# Patient Record
Sex: FEMALE | Race: BLACK OR AFRICAN AMERICAN | NOT HISPANIC OR LATINO | Employment: OTHER | URBAN - METROPOLITAN AREA
[De-identification: names, ages, dates, MRNs, and addresses within clinical notes are randomized per-mention and may not be internally consistent; named-entity substitution may affect disease eponyms.]

---

## 2023-07-21 LAB
ALANINE AMINOTRANSFERASE (SGPT) (U/L) IN SER/PLAS: 12 U/L (ref 7–45)
ALBUMIN (G/DL) IN SER/PLAS: 3.2 G/DL (ref 3.4–5)
ALKALINE PHOSPHATASE (U/L) IN SER/PLAS: 60 U/L (ref 33–136)
ANION GAP IN SER/PLAS: 13 MMOL/L (ref 10–20)
ASPARTATE AMINOTRANSFERASE (SGOT) (U/L) IN SER/PLAS: 17 U/L (ref 9–39)
BASOPHILS (10*3/UL) IN BLOOD BY AUTOMATED COUNT: 0.04 X10E9/L (ref 0–0.1)
BASOPHILS/100 LEUKOCYTES IN BLOOD BY AUTOMATED COUNT: 0.8 % (ref 0–2)
BILIRUBIN TOTAL (MG/DL) IN SER/PLAS: 0.4 MG/DL (ref 0–1.2)
CALCIDIOL (25 OH VITAMIN D3) (NG/ML) IN SER/PLAS: 55 NG/ML
CALCIUM (MG/DL) IN SER/PLAS: 9.1 MG/DL (ref 8.6–10.3)
CARBON DIOXIDE, TOTAL (MMOL/L) IN SER/PLAS: 26 MMOL/L (ref 21–32)
CHLORIDE (MMOL/L) IN SER/PLAS: 105 MMOL/L (ref 98–107)
CHOLESTEROL (MG/DL) IN SER/PLAS: 181 MG/DL (ref 0–199)
CHOLESTEROL IN HDL (MG/DL) IN SER/PLAS: 42.7 MG/DL
CHOLESTEROL/HDL RATIO: 4.2
CREATININE (MG/DL) IN SER/PLAS: 0.67 MG/DL (ref 0.5–1.05)
EOSINOPHILS (10*3/UL) IN BLOOD BY AUTOMATED COUNT: 0.2 X10E9/L (ref 0–0.4)
EOSINOPHILS/100 LEUKOCYTES IN BLOOD BY AUTOMATED COUNT: 3.8 % (ref 0–6)
ERYTHROCYTE DISTRIBUTION WIDTH (RATIO) BY AUTOMATED COUNT: 15.9 % (ref 11.5–14.5)
ERYTHROCYTE MEAN CORPUSCULAR HEMOGLOBIN CONCENTRATION (G/DL) BY AUTOMATED: 30.5 G/DL (ref 32–36)
ERYTHROCYTE MEAN CORPUSCULAR VOLUME (FL) BY AUTOMATED COUNT: 90 FL (ref 80–100)
ERYTHROCYTES (10*6/UL) IN BLOOD BY AUTOMATED COUNT: 4.62 X10E12/L (ref 4–5.2)
GFR FEMALE: 86 ML/MIN/1.73M2
GLUCOSE (MG/DL) IN SER/PLAS: 62 MG/DL (ref 74–99)
HEMATOCRIT (%) IN BLOOD BY AUTOMATED COUNT: 41.7 % (ref 36–46)
HEMOGLOBIN (G/DL) IN BLOOD: 12.7 G/DL (ref 12–16)
IMMATURE GRANULOCYTES/100 LEUKOCYTES IN BLOOD BY AUTOMATED COUNT: 0.2 % (ref 0–0.9)
LDL: 112 MG/DL (ref 0–99)
LEUKOCYTES (10*3/UL) IN BLOOD BY AUTOMATED COUNT: 5.2 X10E9/L (ref 4.4–11.3)
LYMPHOCYTES (10*3/UL) IN BLOOD BY AUTOMATED COUNT: 2.03 X10E9/L (ref 0.8–3)
LYMPHOCYTES/100 LEUKOCYTES IN BLOOD BY AUTOMATED COUNT: 38.8 % (ref 13–44)
MONOCYTES (10*3/UL) IN BLOOD BY AUTOMATED COUNT: 0.46 X10E9/L (ref 0.05–0.8)
MONOCYTES/100 LEUKOCYTES IN BLOOD BY AUTOMATED COUNT: 8.8 % (ref 2–10)
NEUTROPHILS (10*3/UL) IN BLOOD BY AUTOMATED COUNT: 2.49 X10E9/L (ref 1.6–5.5)
NEUTROPHILS/100 LEUKOCYTES IN BLOOD BY AUTOMATED COUNT: 47.6 % (ref 40–80)
PLATELETS (10*3/UL) IN BLOOD AUTOMATED COUNT: 227 X10E9/L (ref 150–450)
POTASSIUM (MMOL/L) IN SER/PLAS: 4.4 MMOL/L (ref 3.5–5.3)
PROTEIN TOTAL: 6.1 G/DL (ref 6.4–8.2)
SODIUM (MMOL/L) IN SER/PLAS: 140 MMOL/L (ref 136–145)
THYROTROPIN (MIU/L) IN SER/PLAS BY DETECTION LIMIT <= 0.05 MIU/L: 1.62 MIU/L (ref 0.44–3.98)
TRIGLYCERIDE (MG/DL) IN SER/PLAS: 133 MG/DL (ref 0–149)
URATE (MG/DL) IN SER/PLAS: 5.1 MG/DL (ref 2.3–6.7)
UREA NITROGEN (MG/DL) IN SER/PLAS: 11 MG/DL (ref 6–23)
VLDL: 27 MG/DL (ref 0–40)

## 2023-11-15 ENCOUNTER — OFFICE VISIT (OUTPATIENT)
Dept: GERIATRIC MEDICINE | Facility: CLINIC | Age: 83
End: 2023-11-15
Payer: MEDICARE

## 2023-11-15 DIAGNOSIS — E03.9 HYPOTHYROIDISM, UNSPECIFIED TYPE: ICD-10-CM

## 2023-11-15 DIAGNOSIS — M15.9 GENERALIZED OSTEOARTHRITIS OF MULTIPLE SITES: ICD-10-CM

## 2023-11-15 DIAGNOSIS — L60.0 INGROWN RIGHT BIG TOENAIL: ICD-10-CM

## 2023-11-15 DIAGNOSIS — R41.9 NEUROCOGNITIVE DISORDER: Primary | ICD-10-CM

## 2023-11-15 DIAGNOSIS — E78.00 ELEVATED LOW DENSITY LIPOPROTEIN (LDL) CHOLESTEROL LEVEL: ICD-10-CM

## 2023-11-15 PROCEDURE — 1160F RVW MEDS BY RX/DR IN RCRD: CPT | Performed by: NURSE PRACTITIONER

## 2023-11-15 PROCEDURE — 1036F TOBACCO NON-USER: CPT | Performed by: NURSE PRACTITIONER

## 2023-11-15 PROCEDURE — 99349 HOME/RES VST EST MOD MDM 40: CPT | Performed by: NURSE PRACTITIONER

## 2023-11-15 PROCEDURE — 1126F AMNT PAIN NOTED NONE PRSNT: CPT | Performed by: NURSE PRACTITIONER

## 2023-11-15 PROCEDURE — 1159F MED LIST DOCD IN RCRD: CPT | Performed by: NURSE PRACTITIONER

## 2023-11-15 ASSESSMENT — ENCOUNTER SYMPTOMS
LOSS OF SENSATION IN FEET: 0
OCCASIONAL FEELINGS OF UNSTEADINESS: 0
DEPRESSION: 0

## 2023-11-15 ASSESSMENT — PAIN SCALES - GENERAL: PAINLEVEL: 0-NO PAIN

## 2023-11-19 VITALS — HEART RATE: 73 BPM | SYSTOLIC BLOOD PRESSURE: 128 MMHG | RESPIRATION RATE: 20 BRPM | DIASTOLIC BLOOD PRESSURE: 70 MMHG

## 2023-11-19 PROBLEM — D49.7 PITUITARY TUMOR: Status: ACTIVE | Noted: 2023-11-19

## 2023-11-19 PROBLEM — M06.9 RHEUMATOID ARTHRITIS, UNSPECIFIED (MULTI): Status: ACTIVE | Noted: 2018-05-16

## 2023-11-19 PROBLEM — L60.0 INGROWN RIGHT BIG TOENAIL: Status: ACTIVE | Noted: 2023-11-19

## 2023-11-19 PROBLEM — R41.9 NEUROCOGNITIVE DISORDER: Status: ACTIVE | Noted: 2023-11-19

## 2023-11-19 PROBLEM — D64.9 ANEMIA: Status: ACTIVE | Noted: 2023-01-28

## 2023-11-19 PROBLEM — E03.9 HYPOTHYROIDISM: Status: ACTIVE | Noted: 2023-11-19

## 2023-11-19 PROBLEM — E78.00 ELEVATED LOW DENSITY LIPOPROTEIN (LDL) CHOLESTEROL LEVEL: Status: ACTIVE | Noted: 2023-11-19

## 2023-11-19 PROBLEM — M15.9 GENERALIZED OSTEOARTHRITIS OF MULTIPLE SITES: Status: ACTIVE | Noted: 2023-11-19

## 2023-11-19 RX ORDER — ACETAMINOPHEN 325 MG/1
325 TABLET ORAL EVERY 6 HOURS PRN
COMMUNITY
Start: 2019-02-12

## 2023-11-19 RX ORDER — LEVOTHYROXINE SODIUM 175 UG/1
175 TABLET ORAL WEEKLY
COMMUNITY
Start: 2023-01-21

## 2023-11-19 RX ORDER — AMMONIUM LACTATE 12 G/100G
1 LOTION TOPICAL AS NEEDED
COMMUNITY

## 2023-11-19 NOTE — PROGRESS NOTES
Reason for visit:  Neurocognitive Disorder-follow up for management of chronic active illness.     Summary Statement:  Mrs. Lamb is an 83 you elderly woman with a PMH significant for dementia ( advanced), hypothyroidism, HLD, OAB, Vitamin D deficiency, severe OA/DJD  of the knees/hips,  s/p right hip arthroplasty,   RA chronic venous insufficiency , GERD, HTN, Pituitary Macroadenoma(nonoperative treatment), and anemia.  ,     Reason for homebound status:  Leaving her home requires a considerable and taxing amount of effort due to nonambulatory secondary to severe OA of knees and hips.       HPI: Mrs. Lamb is being seen today in her home for her 6 month follow up visit in the presence of her daughter Bruno.  Patient is unable to provide history due to advanced dementia.  Daughter states that patient does not talk as much anymore.     She is in bed at the the time of visit.  Bruno gets her up OOB during th day with the [employer]ift.  She has private pay caregivers who come in while she is at work.    In the interim patient had an issues with her right great toe nail which has resovled.       No cough, cold or flu like symptoms  Does not take vaccines.   Interval History:   Recent illness, hospitalizations or surgeries since last visit: No  Previous hospitalizations: No     Diet and Physical Activity:  Current Diet:    Regular  Dietary Supplements: None  Appetite: good  Food Consistency: Regular  Liquids Consistency: thin   Gait/Mobility: Bedbound -[employer]ana Lift         Activities of Daily Living  Bathing:  Dependent  Dressing:  Dependent  Toileting:  Dependent  Feeding:   Dependent  Personal Hygiene:  Dependent    Continence:       Bowels: Incontinent        Bladder:  Incontinent     Instrumental Activities of Daily Living  Managing Finances:  Dependent  Shopping:   Dependent  Managing Medications:   Dependent  Basic Home Maintenance:   Dependent  Housework:   Dependent  Laundry:       Dependent  Preparing Meals:    Dependent    Falls Risk Screening:  Have you fallen in last 6 months? No       Home Safety Risk Factors:  None    Advance Directives:  Patient's DNR status:Full Code      Guardianship: Bruno Lamb (dtr) since 2017        Physical Exam  Constitutional   General appearance: Sitting upright in bed, NAD  Head and Face   Head and face: Abnormal. Flexed to the right.   Eyes Sclerae nonicteric. Does not track.   Ears, Nose, Mouth, and Throat   Ears: Normal.    Oropharynx: lips moist.  Did not open mouth upon command  Hearing: Normal.       Neck   Neck Exam: Unable to observe-wearing a soft collar pillow.  Pulmonary   Respiratory assessment: No respiratory distress, normal respiratory rhythm and effort.    Auscultation of Lungs: Clear bilateral breath sounds. No rales, rhonchi or wheezes.   Cardiovascular   Auscultation of heart: Apical pulse normal, heart rate and rhythm normal, normal S1 and S2, no murmurs, no gallops and no pericardial rub.    Pedal pulses: Regular rate. 1 Dp's Unable to palpate PTs+ bilaterally.    Exam for edema: No peripheral edema.   Chest   Chest: Symmetrical and normal appearance.  Breast  No lumps or nipple dimpling    Abdomen   Abdomen: Abnormal.  The abdomen was obese. The abdomen was soft and nontender. Wearing a diaper.   Genitourinary   Bladder: Normal on palpation.   Lymphatic   Palpation of lymph nodes in neck: No cervical lymphadenopathy.   Musculoskeletal   Range of motion: Abnormal.  decrease to bilateral knees, UE's 5/5 ,RLE 5/5 LLE 4/5, left foot grossly interally rotated. + Cogwheeling R > L.   Skin   Skin and subcutaneous tissue: Abnormal.  venous insufficiency with trophic changes for BLE,. Warm to touch.   Neurologic Unable to assess. Does not follow commands No tremors noted   Psychiatric   judgment and insight:    Unable to assess   Orientation to person, place, and time: advanced dementia -does not respond    Mood and affect: smiles occasionally.   Recent and remote memory:  "Impaired      All Labs from 7/2023 reviewed    Including   Lab Results   Component Value Date    TSH 1.62 07/21/2023    TSH 1.52 02/01/2023    TSH 2.17 03/22/2022     No results found for: \"FREET4\"  No results found for: \"BCIJJZRN69\"  Lab Results   Component Value Date    HGBA1C 5.4 03/22/2022    HGBA1C 5.6 09/02/2020     Lab Results   Component Value Date    VITD25 55 07/21/2023    VITD25 46 02/01/2023    VITD25 33 03/22/2022          Assessment/Plan     1. Neurocognitive disorder  -advanced  -total care  -well cared for in the home  -no signs of abuse or neglect  -Full Code     2. Hypothyroidism, unspecified type  -TSH well within normal limits on Levothyroxine 175 micrograms WEEKLY      3. Ingrown right big toenail  -treated and resolved     4. Generalized osteoarthritis of multiple sites  -controlled  with a  massager as needed and  -Tylenol and topical pain analgesics as needed       5. Elevated LDL  -difficulty getting patient to take medications daily  -BP and A1c are controlled  - Would recommend eating a heart healthy diet       Stable  Routine follow up in  6 months     "

## 2023-11-19 NOTE — PATIENT INSTRUCTIONS
Dear Mrs. Lamb,    It was a pleasure seeing you today!!    You are doing well as everything is stable.    Your blood work in July 2023 was very good -except for your LDL Cholesterol (Bad cholesterol)  Was slightly elevated at 112, but this was an improvement from 123 a year ago.  The goal is less than 100, and  Your HDL ( good cholesterol) was a little low at 42 . 7 (goal is greater than 50)    I would not recommend starting medication for this- just continue to eat a hear healthy diet.     I will follow up with you in 6 months or sooner if needed.     Sincerely,    MADDIE Mireles-BC

## 2023-12-04 ENCOUNTER — DOCUMENTATION (OUTPATIENT)
Dept: GERIATRIC MEDICINE | Facility: CLINIC | Age: 83
End: 2023-12-04
Payer: MEDICARE

## 2023-12-04 NOTE — PROGRESS NOTES
NP received the following message from her daughter - per weekened on call note;  12/4/2023 Dotty Lamb (1940) - (House Calls) Brown - Lower left leg smaller than right - Call from DTR, Bruno. Reports pt's leg is becoming progressively smaller than right leg, noticed 2-3 weeks ago. Forgot to mention to CNP at last visit. Denies right leg with increased swelling. Concerned “bone may be deteriorating inside leg” of bed bound pt. On request, provided pictures of both legs for comparison. Denies increased swelling above smaller portion of left leg. Neg warmth, erythema, increased pain. Reports calves are equal in size and have remained the same size. Denies injury to L leg. Collaborated with Dr. Dior, possibly be lipodermatosclerosis, recommend follow-up in-person visit with Sonia Beck CNP for full assessment. May try wrapping with ACE bandage in the meantime. DTR will wait for visit from NP.      NP saw patient on 11/15/2023 and a complete exam was performed.  Nothing stood out as a concern.   Patient has been bedbound for a couple of years now.  Most likely muscle wasting as she has not had wounds or osteomyelitis that would send anything to the bone of her leg.      I left a VM that if this is something more than or other than what was present at the visit, there is no further workup to be done.  Her CBC in July was well WNL with a normal WBC . Vitamin D level 46 - on no supplementation.  My suspicion for lipodermatosclerosis is low as this is associated with poor wound healing because of the chronic inflammatory state ad fibrosis.  Venous ulcers commonly co-exist . She has not had any.     A visit is not warranted at the time.     Advised to call back if things change.   KENYETTA

## 2024-02-02 ENCOUNTER — TELEPHONE (OUTPATIENT)
Dept: GERIATRIC MEDICINE | Facility: CLINIC | Age: 84
End: 2024-02-02
Payer: MEDICARE

## 2024-02-02 DIAGNOSIS — R05.2 SUBACUTE COUGH: Primary | ICD-10-CM

## 2024-02-02 RX ORDER — BENZONATATE 100 MG/1
100 CAPSULE ORAL 3 TIMES DAILY PRN
Qty: 42 CAPSULE | Refills: 0 | Status: SHIPPED | OUTPATIENT
Start: 2024-02-02 | End: 2024-03-03

## 2024-02-02 NOTE — TELEPHONE ENCOUNTER
Called back and spoke with dtr-pt had a URI last week with productive cough.  Is improved and no longer productive, but has an annoying cough that has been hanging on.  No sob or wheezing, no f/c.  Has tried robitussin dm and several other preparations without any improvement.  Scripted tessalon perles-inst dtr that she needs to swallow them whole

## 2024-02-05 ENCOUNTER — TELEPHONE (OUTPATIENT)
Dept: GERIATRIC MEDICINE | Facility: CLINIC | Age: 84
End: 2024-02-05
Payer: MEDICARE

## 2024-02-05 ENCOUNTER — TELEPHONE (OUTPATIENT)
Dept: GERIATRIC MEDICINE | Facility: CLINIC | Age: 84
End: 2024-02-05

## 2024-02-05 DIAGNOSIS — R05.1 ACUTE COUGH: Primary | ICD-10-CM

## 2024-02-05 NOTE — TELEPHONE ENCOUNTER
"Yesterday, NP saw where daughter called Friday to report patient had a cough.  NP called to see how was patient doing. Left VM and Dtr called this morning:  \"Dotty Lamb : 38 Phone: [phone] Daughter called stating that pt will not swallow the Tessalon Perles whole and is chewing them up. Daughter stated that pt is still coughing. \"    Patient has advanced dementia.  NP recommended Robitussin DM and nurse let her know.     Will order CXR.   DB  "

## 2024-02-07 ENCOUNTER — TELEPHONE (OUTPATIENT)
Dept: GERIATRIC MEDICINE | Facility: CLINIC | Age: 84
End: 2024-02-07
Payer: MEDICARE

## 2024-02-08 NOTE — TELEPHONE ENCOUNTER
"      NP spoke with Bruno and made her aware that CXR said that lungs were \"congested\".    She states that she figured so and she bought her Father's Bernabe she has noticed that the cough has lessened and she is able to bring up mucus and is doing so much better.     Noted.  DB  "

## 2024-06-18 DIAGNOSIS — E03.9 HYPOTHYROIDISM, UNSPECIFIED TYPE: Primary | ICD-10-CM

## 2024-06-18 RX ORDER — LEVOTHYROXINE SODIUM 175 UG/1
175 TABLET ORAL WEEKLY
Qty: 12 TABLET | Refills: 3 | Status: SHIPPED | OUTPATIENT
Start: 2024-06-18 | End: 2025-06-18

## 2024-06-20 RX ORDER — LEVOTHYROXINE SODIUM 175 UG/1
TABLET ORAL
Qty: 12 TABLET | OUTPATIENT
Start: 2024-06-20

## 2024-08-27 ENCOUNTER — OFFICE VISIT (OUTPATIENT)
Dept: GERIATRIC MEDICINE | Facility: CLINIC | Age: 84
End: 2024-08-27
Payer: MEDICARE

## 2024-08-27 DIAGNOSIS — E03.9 HYPOTHYROIDISM, UNSPECIFIED TYPE: ICD-10-CM

## 2024-08-27 DIAGNOSIS — M62.562 ATROPHY OF MUSCLE OF LEFT LOWER LEG: ICD-10-CM

## 2024-08-27 DIAGNOSIS — M21.962 ACQUIRED DEFORMITY OF LEFT LOWER LEG: ICD-10-CM

## 2024-08-27 DIAGNOSIS — R60.0 UNILATERAL EDEMA OF LOWER EXTREMITY: ICD-10-CM

## 2024-08-27 DIAGNOSIS — F03.90 MAJOR NEUROCOGNITIVE DISORDER (MULTI): Primary | ICD-10-CM

## 2024-08-27 PROCEDURE — 1159F MED LIST DOCD IN RCRD: CPT | Performed by: NURSE PRACTITIONER

## 2024-08-27 PROCEDURE — 99349 HOME/RES VST EST MOD MDM 40: CPT | Performed by: NURSE PRACTITIONER

## 2024-08-27 PROCEDURE — 1036F TOBACCO NON-USER: CPT | Performed by: NURSE PRACTITIONER

## 2024-08-27 PROCEDURE — 1126F AMNT PAIN NOTED NONE PRSNT: CPT | Performed by: NURSE PRACTITIONER

## 2024-08-27 PROCEDURE — 1157F ADVNC CARE PLAN IN RCRD: CPT | Performed by: NURSE PRACTITIONER

## 2024-08-27 PROCEDURE — 1160F RVW MEDS BY RX/DR IN RCRD: CPT | Performed by: NURSE PRACTITIONER

## 2024-08-27 ASSESSMENT — PAIN SCALES - GENERAL: PAINLEVEL: 0-NO PAIN

## 2024-08-30 VITALS — SYSTOLIC BLOOD PRESSURE: 120 MMHG | RESPIRATION RATE: 20 BRPM | DIASTOLIC BLOOD PRESSURE: 70 MMHG | HEART RATE: 67 BPM

## 2024-08-30 PROBLEM — M62.562 ATROPHY OF MUSCLE OF LEFT LOWER LEG: Status: ACTIVE | Noted: 2024-08-30

## 2024-08-30 PROBLEM — F03.90 MAJOR NEUROCOGNITIVE DISORDER (MULTI): Status: ACTIVE | Noted: 2024-08-30

## 2024-08-30 PROBLEM — M21.962 ACQUIRED DEFORMITY OF LEFT LOWER LEG: Status: ACTIVE | Noted: 2024-08-30

## 2024-08-30 PROBLEM — R60.0 UNILATERAL EDEMA OF LOWER EXTREMITY: Status: ACTIVE | Noted: 2024-08-30

## 2024-08-30 NOTE — PROGRESS NOTES
Some elements may have been copied from prior note(s). The elements have been updated and reflect current evaluation, examination and decision making from today.           Reason for visit:  Follow up for cognitive impairment and management of chronic active illnesses.     Summary Statement:  Mrs. Lamb is an 84 you elderly woman with a PMH significant for dementia ( advanced), hypothyroidism, HLD, OAB, Vitamin D deficiency, severe OA/DJD  of the knees/hips,  s/p right hip arthroplasty,   RA chronic venous insufficiency , GERD, HTN, Pituitary Macroadenoma(nonoperative treatment), and anemia.  ,      Reason for homebound status:  Leaving her home requires a considerable and taxing amount of effort due to nonambulatory secondary to severe OA of knees and hips.         HPI: Mrs. Lamb is being seen today in her home for her 6 month follow up visit in the presence of her daughter Bruno.  Patient is unable to provide history due to advanced dementia.  Difficulty scheduling in the interim.     Patient is aphasic due to advanced dementia.    Bruno reports patient has been doing well.  Has 2 caregivers who alternate during the week while she works  Appetite is good  Incontinent of bowel and bladder  No falls/ED visits or hospitalizations  Has some phlegm in throat at times, but no wheezing.  Takes levothyroxine weekly        No cough, cold or flu like symptoms  Does not take vaccines.   Advance Directives:  Patient's DNR status:Full Code        Guardianship: Bruno Lamb (dtr) since 2017           Physical Exam  Constitutional   General appearance: Sitting upright in bed, asleep, Briefly opens eyes. NAD  Head and Face   Head and face: Abnormal. Flexed to the right.  Face supple.    Eyes Sclerae nonicteric. No tracking   Ears, Nose, Mouth, and Throat   Ears: Normal.    Oropharynx: lips moist.  Did not open mouth upon command  Hearing: Normal.        Neck   Neck Exam: Unable to observe-  Pulmonary   Respiratory assessment:  "No respiratory distress, normal respiratory rhythm and effort.    Auscultation of Lungs: Clear bilateral breath sounds. No rales, rhonchi or wheezes.   Cardiovascular   Auscultation of heart: Apical pulse normal, heart rate and rhythm normal, normal S1 and S2, no murmurs, no gallops and no pericardial rub.    Pedal pulses: Regular rate. 1 Dp's Unable to palpate PTs+ bilaterally.    Exam for edema: No peripheral edema.   Chest   Chest: Symmetrical and normal appearance.  Breast  No lumps or nipple dimpling    Abdomen   Abdomen: Abnormal.  The abdomen was obese. The abdomen was soft and nontender. Wearing a diaper.   Genitourinary   Bladder: Normal on palpation.   Lymphatic   Palpation of lymph nodes in neck: No cervical lymphadenopathy.   Musculoskeletal   Range of motion: Abnormal.  decrease to bilateral knees, UE's 5/5 ,RLE 5/5 LLE 4/5, left foot grossly interally rotated. + Cogwheeling R > L.   Profound atrophy to LLE with calf being  extremely larger, but no evidence of a DVT   Squeezes NP's hand (strong -r )   Skin   Skin and subcutaneous tissue: Abnormal.  venous insufficiency with trophic changes for BLE,. Warm to touch.   Neurologic Unable to assess. Does not follow commands No tremors noted   Psychiatric   judgment and insight:    Unable to assess   Orientation to person, place, and time: advanced dementia -does not respond    Mood and affect: smiles occasionally.   Recent and remote memory: Impaired        All Labs from 7/2023 reviewed     Including         Lab Results   Component Value Date     TSH 1.62 07/21/2023     TSH 1.52 02/01/2023     TSH 2.17 03/22/2022      No results found for: \"FREET4\"  No results found for: \"FHEOBHNT38\"        Lab Results   Component Value Date     HGBA1C 5.4 03/22/2022     HGBA1C 5.6 09/02/2020            Lab Results   Component Value Date     VITD25 55 07/21/2023     VITD25 46 02/01/2023     VITD25 33 03/22/2022        ASSESSMENT/PLAN        1. Major neurocognitive " disorder (Multi)  -total care  -check Vitamin B12 and Folate level  -h/o refusing meds so only down to weekly dosing of Levothyroxine  -well cared for in the home  -no s/o abuse or neglect    2. Hypothyroidism, unspecified type  -check TSH /T4   -check CMP     3. Atrophy of muscle of left lower leg/Acquired deformity of left lower leg/Unilateral edema   -etiology unclear  -At the time this note was entered plan was to xray the lower left leg- but patient is bedbound and cannot come downstairs.  NP confirmed with [facility]- the limit for the number of steps in the home is no more than 4, but for the ultrasound, they can put  Those in a bag and can obtain.     -Compared to the last visit,  calf enlargement -hypertrophy was not present.   -Will Ultrasound the left calf. Discussed with daughter at the time this note was entered.  -She agrees with plan  -She will be home next Thursday all day- will request for then     Stable  Routine follow up in 6 months

## 2024-08-30 NOTE — PATIENT INSTRUCTIONS
Dear Mrs. Lamb,  It is always a pleasure to see you !!  Your vital signs were well within normal limits .    I am going to order an ultrasound in the home of your left calf .  It is profound larger . This may be due to the atrophy and muscle wasting of the left leg, but we will see if it provides any additional information.     Unfortunately, we are able to obtain an xray as Bruno is aware due to the number of stairs in the home.   Any further workup you may desire would have to be done on an outpatient basis.   Our phlebotomist will contact you to obtain blood work     I will follow up with you when the results are available and for routine follow up in 6 months.     Sincerely,    Sonia Beck, MSN, APRN-BC

## 2024-09-03 ENCOUNTER — TELEPHONE (OUTPATIENT)
Dept: GERIATRIC MEDICINE | Facility: CLINIC | Age: 84
End: 2024-09-03
Payer: MEDICARE

## 2024-09-03 ENCOUNTER — HOME HEALTH ADMISSION (OUTPATIENT)
Dept: HOME HEALTH SERVICES | Facility: HOME HEALTH | Age: 84
End: 2024-09-03

## 2024-09-03 DIAGNOSIS — E03.9 HYPOTHYROIDISM, UNSPECIFIED TYPE: ICD-10-CM

## 2024-09-03 RX ORDER — LEVOTHYROXINE SODIUM 175 UG/1
175 TABLET ORAL
Qty: 12 TABLET | Refills: 3 | Status: SHIPPED | OUTPATIENT
Start: 2024-09-03 | End: 2025-09-03

## 2024-09-04 ENCOUNTER — LAB (OUTPATIENT)
Dept: LAB | Facility: LAB | Age: 84
End: 2024-09-04
Payer: MEDICARE

## 2024-09-04 ENCOUNTER — HOME CARE VISIT (OUTPATIENT)
Dept: HOME HEALTH SERVICES | Facility: HOME HEALTH | Age: 84
End: 2024-09-04

## 2024-09-04 DIAGNOSIS — F03.90 MAJOR NEUROCOGNITIVE DISORDER (MULTI): ICD-10-CM

## 2024-09-04 DIAGNOSIS — M62.562 ATROPHY OF MUSCLE OF LEFT LOWER LEG: ICD-10-CM

## 2024-09-04 DIAGNOSIS — E03.9 HYPOTHYROIDISM, UNSPECIFIED TYPE: ICD-10-CM

## 2024-09-04 LAB
ALBUMIN SERPL BCP-MCNC: 3.1 G/DL (ref 3.4–5)
ALP SERPL-CCNC: 90 U/L (ref 33–136)
ALT SERPL W P-5'-P-CCNC: 12 U/L (ref 7–45)
ANION GAP SERPL CALC-SCNC: 13 MMOL/L (ref 10–20)
AST SERPL W P-5'-P-CCNC: 15 U/L (ref 9–39)
BILIRUB SERPL-MCNC: 0.4 MG/DL (ref 0–1.2)
BUN SERPL-MCNC: 11 MG/DL (ref 6–23)
CALCIUM SERPL-MCNC: 8.3 MG/DL (ref 8.6–10.3)
CHLORIDE SERPL-SCNC: 104 MMOL/L (ref 98–107)
CO2 SERPL-SCNC: 26 MMOL/L (ref 21–32)
CREAT SERPL-MCNC: 0.57 MG/DL (ref 0.5–1.05)
EGFRCR SERPLBLD CKD-EPI 2021: 89 ML/MIN/1.73M*2
ERYTHROCYTE [DISTWIDTH] IN BLOOD BY AUTOMATED COUNT: 16 % (ref 11.5–14.5)
GLUCOSE SERPL-MCNC: 77 MG/DL (ref 74–99)
HCT VFR BLD AUTO: 41 % (ref 36–46)
HGB BLD-MCNC: 12.5 G/DL (ref 12–16)
MCH RBC QN AUTO: 27.3 PG (ref 26–34)
MCHC RBC AUTO-ENTMCNC: 30.5 G/DL (ref 32–36)
MCV RBC AUTO: 90 FL (ref 80–100)
NRBC BLD-RTO: 0 /100 WBCS (ref 0–0)
PLATELET # BLD AUTO: 228 X10*3/UL (ref 150–450)
POTASSIUM SERPL-SCNC: 4.1 MMOL/L (ref 3.5–5.3)
PROT SERPL-MCNC: 6.2 G/DL (ref 6.4–8.2)
RBC # BLD AUTO: 4.58 X10*6/UL (ref 4–5.2)
SODIUM SERPL-SCNC: 139 MMOL/L (ref 136–145)
TSH SERPL-ACNC: 1.12 MIU/L (ref 0.44–3.98)
WBC # BLD AUTO: 3.2 X10*3/UL (ref 4.4–11.3)

## 2024-09-04 PROCEDURE — 82746 ASSAY OF FOLIC ACID SERUM: CPT

## 2024-09-04 PROCEDURE — 36415 COLL VENOUS BLD VENIPUNCTURE: CPT

## 2024-09-04 PROCEDURE — 85027 COMPLETE CBC AUTOMATED: CPT

## 2024-09-04 PROCEDURE — 84443 ASSAY THYROID STIM HORMONE: CPT

## 2024-09-04 PROCEDURE — 80053 COMPREHEN METABOLIC PANEL: CPT

## 2024-09-04 PROCEDURE — 82607 VITAMIN B-12: CPT

## 2024-09-05 LAB
FOLATE SERPL-MCNC: >24 NG/ML
VIT B12 SERPL-MCNC: 400 PG/ML (ref 211–911)

## 2024-09-09 ENCOUNTER — TELEPHONE (OUTPATIENT)
Dept: GERIATRIC MEDICINE | Facility: CLINIC | Age: 84
End: 2024-09-09

## 2024-09-10 ENCOUNTER — TELEPHONE (OUTPATIENT)
Dept: BEHAVIORAL HEALTH | Facility: CLINIC | Age: 84
End: 2024-09-10
Payer: MEDICARE

## 2024-09-12 NOTE — PROGRESS NOTES
Message sent to Sonia Beck CNP  That patients daughter, Bruno would like to discuss lab work results and can be reached at [phone].

## 2024-09-13 ENCOUNTER — TELEPHONE (OUTPATIENT)
Dept: GERIATRIC MEDICINE | Facility: CLINIC | Age: 84
End: 2024-09-13
Payer: MEDICARE

## 2024-09-15 NOTE — TELEPHONE ENCOUNTER
"Lab Results  NP spoke with daughter today.  Made aware that labs are stable and no new orders.  She stated ultrasound of LLE was done on 9/10/1014  NP did not received any results  Made aware would contact [facility] today and if it is unremarkable, will not call back.   States Tech at the time of exam said she did not see a clot.  Official report received and reviewed at the time this note was entered-  No sonographic evidence of DVT.  Report to be scanned \"Media\" section of chart    DB  "

## 2025-06-09 ENCOUNTER — OFFICE VISIT (OUTPATIENT)
Dept: GERIATRIC MEDICINE | Facility: CLINIC | Age: 85
End: 2025-06-09
Payer: MEDICARE

## 2025-06-09 VITALS — HEART RATE: 63 BPM | SYSTOLIC BLOOD PRESSURE: 118 MMHG | DIASTOLIC BLOOD PRESSURE: 60 MMHG | RESPIRATION RATE: 20 BRPM

## 2025-06-09 DIAGNOSIS — R41.9 NEUROCOGNITIVE DISORDER: ICD-10-CM

## 2025-06-09 DIAGNOSIS — R23.8 SKIN BREAKDOWN: ICD-10-CM

## 2025-06-09 DIAGNOSIS — E78.00 ELEVATED LOW DENSITY LIPOPROTEIN (LDL) CHOLESTEROL LEVEL: ICD-10-CM

## 2025-06-09 DIAGNOSIS — E03.9 HYPOTHYROIDISM, UNSPECIFIED TYPE: Primary | ICD-10-CM

## 2025-06-09 DIAGNOSIS — D64.9 ANEMIA, UNSPECIFIED TYPE: ICD-10-CM

## 2025-06-09 PROCEDURE — 99349 HOME/RES VST EST MOD MDM 40: CPT | Performed by: NURSE PRACTITIONER

## 2025-06-09 PROCEDURE — 1160F RVW MEDS BY RX/DR IN RCRD: CPT | Performed by: NURSE PRACTITIONER

## 2025-06-09 PROCEDURE — G2211 COMPLEX E/M VISIT ADD ON: HCPCS | Performed by: NURSE PRACTITIONER

## 2025-06-09 PROCEDURE — 1036F TOBACCO NON-USER: CPT | Performed by: NURSE PRACTITIONER

## 2025-06-09 PROCEDURE — 1159F MED LIST DOCD IN RCRD: CPT | Performed by: NURSE PRACTITIONER

## 2025-06-09 PROCEDURE — 1126F AMNT PAIN NOTED NONE PRSNT: CPT | Performed by: NURSE PRACTITIONER

## 2025-06-09 PROCEDURE — 1157F ADVNC CARE PLAN IN RCRD: CPT | Performed by: NURSE PRACTITIONER

## 2025-06-09 ASSESSMENT — PAIN SCALES - GENERAL: PAINLEVEL_OUTOF10: 0-NO PAIN

## 2025-06-09 NOTE — PATIENT INSTRUCTIONS
Dear Mrs. Lamb,  It is always a pleasure seeing you.     Overall, you are doing well.  Bruno takes excellent care of you !!    You are due a few routine labs.  We will request them for around the end of August.  Disregard any text messages you receive asking you to come to the lab.  Our phlebotomist will call you at the time to schedule.     I will follow up with you in 6- 8 months.     Sincerely,    Sonia Beck, MSN, APRN-BC

## 2025-06-09 NOTE — PROGRESS NOTES
Some elements may have been copied from prior note(s). The elements have been updated and reflect current evaluation, examination and decision making from today.             Reason for visit:  Follow up for cognitive impairment and management of chronic active illnesses.      Summary Statement:  Mrs. Lamb is an 85 you elderly woman with a PMH significant for dementia ( advanced), hypothyroidism, HLD, OAB, Vitamin D deficiency, severe OA/DJD  of the knees/hips,  s/p right hip arthroplasty,   RA chronic venous insufficiency , GERD, HTN, Pituitary Macroadenoma(nonoperative treatment), and anemia.  ,      Reason for homebound status:  Leaving her home requires a considerable and taxing amount of effort due to nonambulatory   secondary to severe OA of knees and hips.         HPI: Mrs. Lamb is being seen today in her home for her 6 month follow up visit in the presence of her daughter Bruno.    Patient is unable to provide history due to advanced dementia.   In the interim Bruno reports that patient has been doing well.  At times she does talk.  Bruno gets her out of bed via [employer]ana lift twice a day.  No new issues or concerns.  Continues to take levothyroxine w all mild lower yeah eekly.  Had a small pressure ulcer to buttocks but it is now healing.  Using Triad cream.  Appetite is very hard he.  No cough cold or flulike symptoms.  Moves bowels very well.  Sleeps well yes may    Advance Directives:  Patient's DNR status:Full Code        Guardianship: Bruno Lamb (dtr) since 2017           Physical Exam  /60 (BP Location: Left arm, Patient Position: Sitting, BP Cuff Size: Adult)   Pulse 63   Resp 20   /POX=94% RA  Constitutional   General appearance: Sitting upright in bed,. Smiling. NAD   Head and Face   Head and face: Abnormal. Flexed to the right.  Face supple.    Eyes Sclerae nonicteric. No tracking   Ears, Nose, Mouth, and Throat   Ears: Normal.    Oropharynx: lips moist.  Did not open mouth upon  command  Hearing: Normal.     Neck   Neck Exam: Unable to observe-  Pulmonary   Respiratory assessment: No respiratory distress, normal respiratory rhythm and effort.    Auscultation of Lungs: Clear bilateral breath sounds. No rales, rhonchi or wheezes.   Cardiovascular   Auscultation of heart: Apical pulse normal, heart rate and rhythm normal, normal S1 and S2,   no murmurs, no gallops and no pericardial rub.    Exam for edema: No peripheral edema.   Chest   Chest: Symmetrical and normal appearance.  Breast  deferred  Abdomen   Abdomen: Abnormal.  The abdomen was obese. The abdomen was soft and nontender. Wearing a diaper.   Genitourinary   Bladder: Normal on palpation.   Lymphatic   Palpation of lymph nodes in neck: No cervical lymphadenopathy.   Musculoskeletal   Range of motion: Abnormal.  decrease to bilateral knees, UE's 5/5 ,RLE 5/5 LLE 4/5, left foot grossly interally rotated. + Cogwheeling R > L.   Profound atrophy to LLE with calf being  ,but no evidence of a DVT   Squeezes NP's hand (strong -r )   Contracture to right knee.   Skin   Skin and subcutaneous tissue: Abnormal.  venous insufficiency with trophic changes for BLE,.  Feet slighlty cool to touch but ceiling fan is on. Wearing heel protectors.   Neurologic Unable to assess. Does not follow commands No tremors noted   Psychiatric   judgment and insight:    Unable to assess   Orientation to person, place, and time: advanced dementia -does not respond    Mood and affect: smiles occasionally.   Recent and remote memory: Impaired        All Labs from 7/2023 reviewed      ASSESSMENT/PLAN   1. Hypothyroidism, unspecified type (Primary)  -continue Levothyroxine weekly  -Check TSH/T4 if indicated    2. Elevated low density lipoprotein (LDL) cholesterol level  -check lipid profile and CMP     3. Anemia, unspecified type  Check CBC     4. Neurocognitive disorder  -stable  -total care  -well cared for in the home     5. Skin breakdown  -not observed to  buttocks  -Bruno reports the size of a nickel and is near-healed using Triad Cream     Stable  Routine follow up 6- 8 months   Daughter has requested for labs to be drawn at the end of August 2025

## 2025-07-16 ENCOUNTER — TELEPHONE (OUTPATIENT)
Dept: GERIATRIC MEDICINE | Facility: CLINIC | Age: 85
End: 2025-07-16
Payer: MEDICARE

## 2025-07-16 NOTE — TELEPHONE ENCOUNTER
Daughter called stating that while performing darrius-care on pt. A large red bulge emerged out of her rectum. It stayed for a few minutes then went back inside her rectum. No bleeding observed. Stated pt has a hx of constipation and has BM's a few times a week. Daughter is not sure when pt's LBM was prior to today. I did inform her that it is probably a hemorrhoid and it is important that pt does not become constipated. But daughter is wondering if it is an emergency.

## 2025-07-17 NOTE — TELEPHONE ENCOUNTER
"PCP responded stating \"I Agree unless it was a rectal prolapse, either way, there is nothing to do for it. Ask her to Google images of external rectal hemorrhoids and then images of a rectal prolapse to see if it looks like either one. Either way, the hemorrhoids you increase fiber in the diet, avoid straining, and can apply Preparation H as needed. A rectal prolapse, all you can do at this age is protect it by keeping it lubricated with vaseline to avoid injury and gently push it back up into the rectal vault. Let me know. Thanks !!\" Spoke with daughter, stated she saw a little blood this morning while performing darrius-care. She was not happy with your recommendation. She wants \"a proper assessment done\" and not \"guessing over the phone\". She does not want to Google anything. She stated she is going to have pt transferred to the ED. PCP made aware of daughter's statement, stated \"I will call her. Her perception is way off base. The goal was for her to tell us what it looks like so we could advise properly. I will do the diagnosing. Thank you so much!!\"    Addendum:    NP spoke with daughter and let her know that she misunderstood what was said.  NP asked the nurse to have her look up, i.e. Google a picture of rectal hemorrhoids and a picture of a rectal prolapse.    NP asked for the nurse to have her tell her does not l it ook like either 1 of these.    NP spelled out in detail based upon what it looks like what you would do.    NP clearly stated to her daughter today, that the goal was never for her to do the diagnosing, but for her to help clarify  what she was saying.   Daughter I kept interrupting NP.  Stated that my office said I was off tomorrow and could not come out.    This is correct.  NP also reminded her that we do not do urgent care, but occasionally there is a slot where we can come out.  She stated that all was well between us but she just felt she needed the peace of mind to have her mother  properly " examined.  NP did not discourage her from taking her mother to the ED.    In addition, she stated that yesterday she had already Googled and saw what it possibly could be.  Noted.  DB

## 2025-07-31 ENCOUNTER — OFFICE VISIT (OUTPATIENT)
Dept: GERIATRIC MEDICINE | Facility: CLINIC | Age: 85
End: 2025-07-31
Payer: MEDICARE

## 2025-07-31 DIAGNOSIS — K62.5 RECTAL BLEEDING: Primary | ICD-10-CM

## 2025-07-31 PROCEDURE — 99348 HOME/RES VST EST LOW MDM 30: CPT | Performed by: STUDENT IN AN ORGANIZED HEALTH CARE EDUCATION/TRAINING PROGRAM

## 2025-08-06 DIAGNOSIS — E03.9 HYPOTHYROIDISM, UNSPECIFIED TYPE: ICD-10-CM

## 2025-08-06 RX ORDER — LEVOTHYROXINE SODIUM 175 UG/1
175 TABLET ORAL
Qty: 12 TABLET | Refills: 3 | Status: SHIPPED | OUTPATIENT
Start: 2025-08-06 | End: 2026-08-06

## 2025-08-11 ASSESSMENT — ENCOUNTER SYMPTOMS: HEMATOCHEZIA: 1

## 2025-08-14 ENCOUNTER — DOCUMENTATION (OUTPATIENT)
Dept: GERIATRIC MEDICINE | Facility: CLINIC | Age: 85
End: 2025-08-14
Payer: MEDICARE

## 2025-08-14 DIAGNOSIS — K62.5 RECTAL BLEEDING: ICD-10-CM

## 2025-08-14 DIAGNOSIS — E78.00 ELEVATED LOW DENSITY LIPOPROTEIN (LDL) CHOLESTEROL LEVEL: ICD-10-CM

## 2025-08-14 DIAGNOSIS — E03.9 HYPOTHYROIDISM, UNSPECIFIED TYPE: Primary | ICD-10-CM

## 2025-08-15 ENCOUNTER — HOME CARE VISIT (OUTPATIENT)
Dept: HOME HEALTH SERVICES | Facility: HOME HEALTH | Age: 85
End: 2025-08-15

## 2025-08-16 LAB
ALBUMIN SERPL-MCNC: 3.3 G/DL (ref 3.6–5.1)
ALP SERPL-CCNC: 66 U/L (ref 37–153)
ALT SERPL-CCNC: 14 U/L (ref 6–29)
ANION GAP SERPL CALCULATED.4IONS-SCNC: 9 MMOL/L (CALC) (ref 7–17)
AST SERPL-CCNC: 18 U/L (ref 10–35)
BILIRUB SERPL-MCNC: 0.4 MG/DL (ref 0.2–1.2)
BUN SERPL-MCNC: 10 MG/DL (ref 7–25)
CALCIUM SERPL-MCNC: 9 MG/DL (ref 8.6–10.4)
CHLORIDE SERPL-SCNC: 106 MMOL/L (ref 98–110)
CHOLEST SERPL-MCNC: 173 MG/DL
CHOLEST/HDLC SERPL: 3.8 (CALC)
CO2 SERPL-SCNC: 24 MMOL/L (ref 20–32)
CREAT SERPL-MCNC: 0.54 MG/DL (ref 0.6–0.95)
EGFRCR SERPLBLD CKD-EPI 2021: 90 ML/MIN/1.73M2
ERYTHROCYTE [DISTWIDTH] IN BLOOD BY AUTOMATED COUNT: 14.4 % (ref 11–15)
GLUCOSE SERPL-MCNC: 97 MG/DL (ref 65–99)
HCT VFR BLD AUTO: 40.8 % (ref 35–45)
HDLC SERPL-MCNC: 46 MG/DL
HGB BLD-MCNC: 12.8 G/DL (ref 11.7–15.5)
LDLC SERPL CALC-MCNC: 107 MG/DL (CALC)
MCH RBC QN AUTO: 28.4 PG (ref 27–33)
MCHC RBC AUTO-ENTMCNC: 31.4 G/DL (ref 32–36)
MCV RBC AUTO: 90.5 FL (ref 80–100)
NONHDLC SERPL-MCNC: 127 MG/DL (CALC)
PLATELET # BLD AUTO: 189 THOUSAND/UL (ref 140–400)
PMV BLD REES-ECKER: 12.7 FL (ref 7.5–12.5)
POTASSIUM SERPL-SCNC: 4.6 MMOL/L (ref 3.5–5.3)
PROT SERPL-MCNC: 6.3 G/DL (ref 6.1–8.1)
RBC # BLD AUTO: 4.51 MILLION/UL (ref 3.8–5.1)
SODIUM SERPL-SCNC: 139 MMOL/L (ref 135–146)
TRIGL SERPL-MCNC: 108 MG/DL
TSH SERPL-ACNC: 1.6 MIU/L (ref 0.4–4.5)
WBC # BLD AUTO: 4.5 THOUSAND/UL (ref 3.8–10.8)

## 2025-08-25 ENCOUNTER — TELEPHONE (OUTPATIENT)
Dept: GERIATRIC MEDICINE | Facility: CLINIC | Age: 85
End: 2025-08-25
Payer: MEDICARE

## 2025-08-25 DIAGNOSIS — M15.9 GENERALIZED OSTEOARTHRITIS OF MULTIPLE SITES: Primary | ICD-10-CM

## 2025-08-25 DIAGNOSIS — R23.8 SKIN BREAKDOWN: ICD-10-CM

## 2025-08-27 DIAGNOSIS — E03.9 HYPOTHYROIDISM, UNSPECIFIED TYPE: ICD-10-CM

## 2025-08-27 DIAGNOSIS — D64.9 ANEMIA, UNSPECIFIED TYPE: ICD-10-CM

## 2025-08-27 DIAGNOSIS — E78.00 ELEVATED LOW DENSITY LIPOPROTEIN (LDL) CHOLESTEROL LEVEL: ICD-10-CM
